# Patient Record
Sex: FEMALE | Race: WHITE | URBAN - METROPOLITAN AREA
[De-identification: names, ages, dates, MRNs, and addresses within clinical notes are randomized per-mention and may not be internally consistent; named-entity substitution may affect disease eponyms.]

---

## 2017-01-30 LAB
HBV SURFACE AG SERPL QL IA: NON REACTIVE
HIV 1+2 AB+HIV1 P24 AG SERPL QL IA: NON REACTIVE
RUBELLA ANTIBODY IGG QUANTITATIVE: NORMAL IU/ML
T PALLIDUM IGG SER QL: NON REACTIVE

## 2017-08-14 LAB — GROUP B STREP PCR: NEGATIVE

## 2017-09-02 ENCOUNTER — HOSPITAL ENCOUNTER (OUTPATIENT)
Facility: CLINIC | Age: 27
Discharge: HOME | End: 2017-09-02
Attending: OBSTETRICS & GYNECOLOGY | Admitting: OBSTETRICS & GYNECOLOGY
Payer: COMMERCIAL

## 2017-09-02 VITALS
SYSTOLIC BLOOD PRESSURE: 96 MMHG | BODY MASS INDEX: 31.01 KG/M2 | OXYGEN SATURATION: 98 % | WEIGHT: 175 LBS | HEART RATE: 80 BPM | HEIGHT: 63 IN | RESPIRATION RATE: 18 BRPM | TEMPERATURE: 98.3 F | DIASTOLIC BLOOD PRESSURE: 46 MMHG

## 2017-09-02 PROBLEM — Z36.89 ENCOUNTER FOR TRIAGE IN PREGNANT PATIENT: Status: ACTIVE | Noted: 2017-09-02

## 2017-09-02 LAB
ALBUMIN UR-MCNC: NEGATIVE MG/DL
APPEARANCE UR: ABNORMAL
BACTERIA #/AREA URNS HPF: ABNORMAL /HPF
BILIRUB UR QL STRIP: NEGATIVE
COLOR UR AUTO: YELLOW
GLUCOSE UR STRIP-MCNC: NEGATIVE MG/DL
HGB UR QL STRIP: ABNORMAL
KETONES UR STRIP-MCNC: NEGATIVE MG/DL
LEUKOCYTE ESTERASE UR QL STRIP: ABNORMAL
MUCOUS THREADS #/AREA URNS LPF: PRESENT /LPF
NITRATE UR QL: NEGATIVE
PH UR STRIP: 5 PH (ref 5–7)
RBC #/AREA URNS AUTO: 12 /HPF (ref 0–2)
SOURCE: ABNORMAL
SP GR UR STRIP: 1.02 (ref 1–1.03)
SQUAMOUS #/AREA URNS AUTO: 2 /HPF (ref 0–1)
UROBILINOGEN UR STRIP-MCNC: 0 MG/DL (ref 0–2)
WBC #/AREA URNS AUTO: 6 /HPF (ref 0–2)

## 2017-09-02 PROCEDURE — 81001 URINALYSIS AUTO W/SCOPE: CPT | Performed by: OBSTETRICS & GYNECOLOGY

## 2017-09-02 PROCEDURE — 96374 THER/PROPH/DIAG INJ IV PUSH: CPT

## 2017-09-02 PROCEDURE — 25000132 ZZH RX MED GY IP 250 OP 250 PS 637: Performed by: OBSTETRICS & GYNECOLOGY

## 2017-09-02 PROCEDURE — 25000128 H RX IP 250 OP 636: Performed by: OBSTETRICS & GYNECOLOGY

## 2017-09-02 PROCEDURE — 96361 HYDRATE IV INFUSION ADD-ON: CPT

## 2017-09-02 PROCEDURE — 99214 OFFICE O/P EST MOD 30 MIN: CPT | Mod: 25

## 2017-09-02 PROCEDURE — 59025 FETAL NON-STRESS TEST: CPT

## 2017-09-02 PROCEDURE — 96375 TX/PRO/DX INJ NEW DRUG ADDON: CPT

## 2017-09-02 RX ORDER — ONDANSETRON 2 MG/ML
4 INJECTION INTRAMUSCULAR; INTRAVENOUS EVERY 6 HOURS PRN
Status: DISCONTINUED | OUTPATIENT
Start: 2017-09-02 | End: 2017-09-02 | Stop reason: HOSPADM

## 2017-09-02 RX ORDER — ACETAMINOPHEN 325 MG/1
975 TABLET ORAL ONCE
Status: COMPLETED | OUTPATIENT
Start: 2017-09-02 | End: 2017-09-02

## 2017-09-02 RX ORDER — PRENATAL VIT/IRON FUM/FOLIC AC 27MG-0.8MG
1 TABLET ORAL DAILY
COMMUNITY

## 2017-09-02 RX ORDER — SODIUM CHLORIDE, SODIUM LACTATE, POTASSIUM CHLORIDE, CALCIUM CHLORIDE 600; 310; 30; 20 MG/100ML; MG/100ML; MG/100ML; MG/100ML
INJECTION, SOLUTION INTRAVENOUS CONTINUOUS
Status: DISCONTINUED | OUTPATIENT
Start: 2017-09-02 | End: 2017-09-02 | Stop reason: HOSPADM

## 2017-09-02 RX ORDER — HYDROMORPHONE HCL/0.9% NACL/PF 0.2MG/0.2
0.2 SYRINGE (ML) INTRAVENOUS
Status: DISCONTINUED | OUTPATIENT
Start: 2017-09-02 | End: 2017-09-02 | Stop reason: HOSPADM

## 2017-09-02 RX ADMIN — ACETAMINOPHEN 975 MG: 325 TABLET, FILM COATED ORAL at 06:13

## 2017-09-02 RX ADMIN — ONDANSETRON 4 MG: 2 INJECTION INTRAMUSCULAR; INTRAVENOUS at 05:50

## 2017-09-02 RX ADMIN — Medication 0.2 MG: at 05:48

## 2017-09-02 RX ADMIN — SODIUM CHLORIDE, POTASSIUM CHLORIDE, SODIUM LACTATE AND CALCIUM CHLORIDE: 600; 310; 30; 20 INJECTION, SOLUTION INTRAVENOUS at 05:51

## 2017-09-02 NOTE — PROGRESS NOTES
" to triage @ 0432 with report of waking up with severe pain on left flank that wraps around to lower left abdomen. VSS. Lungs clear. Denies SOB, vaginal bleeding or leaking, vomiting or diarrhea. Pt stated she is nauseated \"from the pain\". Category 1 FHR tracing, NST reactive. SVE FT/-2. Dr Luda Henrandez notified of the above and orders received. IV started. Pain medication and zofran given with pain decreased. Will strain all urine and have pt seen by rounding dr mckeon am. Plan of care reviewed with pt and significant other and understanding voiced.   "

## 2017-09-02 NOTE — PLAN OF CARE
Strained urine, very small stone found. Dr Dowd updated. No new orders, pt to discharge after breakfast if remains stable.

## 2017-09-02 NOTE — PLAN OF CARE
Data: [facility] and side pain/kidney stone.    Action:  Presumed adequate fetal oxygenation documented (see flow record). Discharge instructions reviewed.  Patient instructed to report change in fetal movement, vaginal leaking of fluid or bleeding, abdominal pain, or any concerns related to the pregnancy to her nurse/physician.   Response: Orders to discharge home per Dr Dowd.  Patient verbalized understanding of education and verbalized agreement with plan.

## 2017-09-02 NOTE — H&P
Magalie Orellana is a 27 year old [id number]  IUP at 38 5/7 weeks admitted for severe left flank pain that began at 2 am. Pain was sudden radiating from the left back  wrapping around to the hip.  Since receiving IV fluids, Dilaudid, Tylenol and Zofran pain is much improved. Now only feeling some mild pressure. She denies dysuria, fevers, chills, frequency, diarrhea, vaginal bleeding, SROM and contractions. Baby is active.    PMH smokes 4-5 cigarette/day  Surgeries wisdom teeth extraction  Allergies NKA  Family history  Heart disease and depression    vss afebrile  HEENT normal  Abdomen nl  Extremities nl  cx per RN closed    Category 1 fetal tracing    UA  Few blood, few bacteria    Assessment  IUP at 38 57 weeks                        Sudden onset of acute left flank pain this am. Much improved after IVF, Dilaudid, Zofran and Tylenol.                        Most likely kidney stone that has passed or is in the process. Now has minimal pain.  Plan  Will discharge home with PO zofran and oxycodone           Precautions reviewed           Has appointment in clinic this Tuesday

## 2017-09-02 NOTE — DISCHARGE INSTRUCTIONS
Discharge Instruction for Undelivered Patients      You were seen for: pain  We Consulted: Dr Hernandez and Dr Dowd  You had (Test or Medicine):UA, fetal and uterine monitoring, zofran, dilauded, tylenol     Diet:   Drink 8 to 12 glasses of liquids (milk, juice, water) every day.  You may eat meals and snacks.     Activity:  Count fetal kicks everyday (see handout)  Call your doctor or nurse midwife if your baby is moving less than usual.     Call your provider if you notice:  Swelling in your face or increased swelling in your hands or legs.  Headaches that are not relieved by Tylenol (acetaminophen).  Changes in your vision (blurring: seeing spots or stars.)  Nausea (sick to your stomach) and vomiting (throwing up).   Weight gain of 5 pounds or more per week.  Heartburn that doesn't go away.  Signs of bladder infection: pain when you urinate (use the toilet), need to go more often and more urgently.  The bag of holland (rupture of membranes) breaks, or you notice leaking in your underwear.  Bright red blood in your underwear.  Abdominal (lower belly) or stomach pain.  Second (plus) baby: Contractions (tightening) less than 10 minutes apart and getting stronger.  Increase or change in vaginal discharge (note the color and amount)  Other: Call clinic with questions or concerns.    **Strain urine for 24 hours. If a stone is found, your primary clinic may want you to bring it in for testing.    Follow-up:  As scheduled in the clinic

## 2017-09-02 NOTE — PROVIDER NOTIFICATION
09/02/17 0810   Provider Notification   Provider Name/Title Dr Dowd   Method of Notification At Bedside   Notification Reason Status Update    at bedside talking with pt. Pt feeling much better, rating pain 2 on 0-10 scale. Voiding normally this morning. Orders to d/c continuous fetal/uterine monitoring, no further monitoring needed before d/c. Pt may order breakfast and if able to eat and continuing to feel well pt to be discharged home with prescriptions for oxycodone and zofran.

## 2017-09-02 NOTE — IP AVS SNAPSHOT
[facility]    [address]    [facility] [address]    Phone:  [phone]    Fax:  [phone]                                       After Visit Summary   9/2/2017    Magalie Paul    MRN: [id number]           After Visit Summary Signature Page     I have received my discharge instructions, and my questions have been answered. I have discussed any challenges I see with this plan with the nurse or doctor.    ..........................................................................................................................................  Patient/Patient Representative Signature      ..........................................................................................................................................  Patient Representative Print Name and Relationship to Patient    ..................................................               ................................................  Date                                            Time    ..........................................................................................................................................  Reviewed by Signature/Title    ...................................................              ..............................................  Date                                                            Time

## 2017-09-02 NOTE — IP AVS SNAPSHOT
MRN:[id number]                      After Visit Summary   9/2/2017    Magalie Paul    MRN: [id number]           Thank you!     Thank you for choosing [facility] for your care. Our goal is always to provide you with excellent care. Hearing back from our patients is one way we can continue to improve our services. Please take a few minutes to complete the written survey that you may receive in the mail after you visit. If you would like to speak to someone directly about your visit please contact Patient Relations at [phone]. Thank you!          Patient Information     Date Of Birth          1990        About your hospital stay     You were admitted on:  September 2, 2017 You last received care in the:  [facility]    You were discharged on:  September 2, 2017       Who to Call     For medical emergencies, please call 911.  For non-urgent questions about your medical care, please call your primary care provider or clinic, None          Attending Provider     Provider Specialty    Luda Hernandez MD OB/Gyn    Karlie Dowd MD OB/Gyn       Primary Care Provider    None Specified      Further instructions from your care team       Discharge Instruction for Undelivered Patients      You were seen for: pain  We Consulted: Dr Hernandez and Dr Dowd  You had (Test or Medicine):UA, fetal and uterine monitoring, zofran, dilauded, tylenol     Diet:   Drink 8 to 12 glasses of liquids (milk, juice, water) every day.  You may eat meals and snacks.     Activity:  Count fetal kicks everyday (see handout)  Call your doctor or nurse midwife if your baby is moving less than usual.     Call your provider if you notice:  Swelling in your face or increased swelling in your hands or legs.  Headaches that are not relieved by Tylenol (acetaminophen).  Changes in your vision (blurring: seeing spots or stars.)  Nausea (sick to your stomach) and vomiting (throwing up).   Weight gain  "of 5 pounds or more per week.  Heartburn that doesn't go away.  Signs of bladder infection: pain when you urinate (use the toilet), need to go more often and more urgently.  The bag of holland (rupture of membranes) breaks, or you notice leaking in your underwear.  Bright red blood in your underwear.  Abdominal (lower belly) or stomach pain.  Second (plus) baby: Contractions (tightening) less than 10 minutes apart and getting stronger.  Increase or change in vaginal discharge (note the color and amount)  Other: Call clinic with questions or concerns.    **Strain urine for 24 hours. If a stone is found, your primary clinic may want you to bring it in for testing.    Follow-up:  As scheduled in the clinic           Pending Results     No orders found from 2017 to 9/3/2017.            Admission Information     Date & Time Provider Department Dept. Phone    2017 Karlie Dowd MD [facility] [phone]      Your Vitals Were     Blood Pressure Pulse Temperature Respirations Height Weight    96/46 80 98.3  F (36.8  C) (Oral) 18 1.6 m (5' 3\") 79.4 kg (175 lb)    Pulse Oximetry BMI (Body Mass Index)                98% 31 kg/m2          [employer] Information     [employer] lets you send messages to your doctor, view your test results, renew your prescriptions, schedule appointments and more. To sign up, go to [email or web address] . Click on \"Log in\" on the left side of the screen, which will take you to the Welcome page. Then click on \"Sign up Now\" on the right side of the page.     You will be asked to enter the access code listed below, as well as some personal information. Please follow the directions to create your username and password.     Your access code is: [email or web address]  Expires: 2017  9:48 AM     Your access code will  in 90 days. If you need help or a new code, please call your [facility] clinic or [phone].        Care EveryWhere ID     This is your Care EveryWhere ID. " This could be used by other organizations to access your [facility] medical records  [id number]        Equal Access to Services     DAY BARROW : Hadii aad ku hadmeaghanfelicity Rhodes, waraquelda luemilyadaha, qaericta kaalmada karynrajni, lex sonia trudymin solerpelon pato bowers. So [facility] [phone].    ATENCIÓN: Si habla español, tiene a merrill disposición servicios gratuitos de asistencia lingüística. Llame al [phone].    We comply with applicable federal civil rights laws and [place] laws. We do not discriminate on the basis of race, color, national origin, age, disability sex, sexual orientation or gender identity.               Review of your medicines      UNREVIEWED medicines. Ask your doctor about these medicines        Dose / Directions    prenatal multivitamin plus iron 27-0.8 MG Tabs per tablet        Dose:  1 tablet   Take 1 tablet by mouth daily   Refills:  0       ZOFRAN PO        Dose:  4 mg   Take 4 mg by mouth every 6 hours as needed for nausea or vomiting   Refills:  0                Protect others around you: Learn how to safely use, store and throw away your medicines at www.disposemymeds.org.             Medication List: This is a list of all your medications and when to take them. Check marks below indicate your daily home schedule. Keep this list as a reference.      Medications           Morning Afternoon Evening Bedtime As Needed    prenatal multivitamin plus iron 27-0.8 MG Tabs per tablet   Take 1 tablet by mouth daily                                ZOFRAN PO   Take 4 mg by mouth every 6 hours as needed for nausea or vomiting                                          More Information        Treating Kidney Stones: Expectant Therapy  Most kidney stones are about the size of a grape seed. Stones of this size are small enough to pass naturally. Once it is passed, a stone can be analyzed. This wait-and-see approach is called expectant therapy. Small stones can often be passed with expectant therapy. If pain  is a problem, ask your healthcare provider about pain medicines. Then follow his or her directions on how much water to drink. Drinking more water creates more urine to flush out your stone. Also be sure to strain your urine. Take any stones you pass to your provider for analysis.    Drink lots of water  Drinking lots of water may help your stone pass. Water also dilutes the chemicals in your urine. This reduces your risk of forming new stones. You may be told to drink 8, 12-ounce glasses of water a day. Avoid liquids that dehydrate you, such as those containing caffeine or alcohol.  Strain your urine  Straining your urine lets you collect your stone for analysis. Use the strainer each time you urinate. Strain your urine for as long as your healthcare provider suggests. Watch for brown, tan, gold, or black specks or tiny flaquito. These may be kidney stones.  Take your medicine  Your healthcare provider may give you medicine that makes it more likely for you to pass the kidney stone.   Follow up with your healthcare provider  Follow up by taking any stones you find to your provider for analysis. The type of stone you have determines your diet and prevention program. You may need more tests in the future. These tests will ensure that new stones are not forming.  Date Last Reviewed: 1/1/2017 2000-2017 [employer]. [address] [place] [address], [place] [address]. All rights reserved. This information is not intended as a substitute for professional medical care. Always follow your healthcare professional's instructions.                Understanding Kidney Stones  Your kidneys are bean-shaped organs. They help filter extra salts, waste, and water from your body. You need to drink enough water every day to help flush the extra salts into your urine.     What are kidney stones?  Kidney stones are made up of chemical crystals that separate out from urine. These crystals clump together to make stones. They form in  the calyx of the kidney. They may stay in the kidney or move into the urinary tract.   Why kidney stones form  Kidneys form stones for many reasons. If you don t drink enough water, for instance, you won t have enough urine to dilute chemicals. Then the chemicals may form crystals, which can develop into stones. Here are some reasons why kidney stones form:    Fluid loss (dehydration). This can concentrate urine, causing stones to form.    Certain foods. Some foods contain large amounts of the chemicals that sometimes crystallize into stones. Eating foods that contain a lot of meat or salt can lead to more kidney stones.    Kidney infections. These infections foster stones by slowing urine flow or changing the acid balance of your urine.    Family history. If family members have had kidney stones, you re more likely to have them, too.    A lack of certain substances in your urine. Some substances can help protect you from forming stones. If you don t have enough of these in your urine, stone formation can increase.  Where stones form  Stones begin in the cup-shaped part of the kidney (calyx). Some stay in the calyx and grow. Others move into the kidney, pelvis, or into the ureter. There they can lodge, block the flow of urine, and cause pain.  Symptoms  Many stones cause sudden and severe pain and bloody urine. Others cause nausea or frequent, burning urination. Symptoms often depend on your stone s size and location. Fever may indicate a serious infection. Call your healthcare provider right away if you develop a fever.  Date Last Reviewed: 1/1/2017 2000-2017 [employer]. [address] [place] [address], [place] [address]. All rights reserved. This information is not intended as a substitute for professional medical care. Always follow your healthcare professional's instructions.